# Patient Record
Sex: MALE | Race: WHITE | NOT HISPANIC OR LATINO | Employment: PART TIME | URBAN - METROPOLITAN AREA
[De-identification: names, ages, dates, MRNs, and addresses within clinical notes are randomized per-mention and may not be internally consistent; named-entity substitution may affect disease eponyms.]

---

## 2020-07-24 ENCOUNTER — HOSPITAL ENCOUNTER (EMERGENCY)
Facility: MEDICAL CENTER | Age: 25
End: 2020-07-24
Attending: EMERGENCY MEDICINE
Payer: COMMERCIAL

## 2020-07-24 VITALS
HEIGHT: 76 IN | TEMPERATURE: 97.9 F | OXYGEN SATURATION: 99 % | WEIGHT: 125.66 LBS | HEART RATE: 71 BPM | BODY MASS INDEX: 15.3 KG/M2 | DIASTOLIC BLOOD PRESSURE: 76 MMHG | SYSTOLIC BLOOD PRESSURE: 121 MMHG | RESPIRATION RATE: 18 BRPM

## 2020-07-24 DIAGNOSIS — S09.90XA CLOSED HEAD INJURY, INITIAL ENCOUNTER: ICD-10-CM

## 2020-07-24 PROCEDURE — 99282 EMERGENCY DEPT VISIT SF MDM: CPT | Mod: EDC

## 2020-07-24 PROCEDURE — 700111 HCHG RX REV CODE 636 W/ 250 OVERRIDE (IP): Mod: EDC | Performed by: EMERGENCY MEDICINE

## 2020-07-24 PROCEDURE — 90471 IMMUNIZATION ADMIN: CPT | Mod: EDC

## 2020-07-24 PROCEDURE — 90715 TDAP VACCINE 7 YRS/> IM: CPT | Mod: EDC | Performed by: EMERGENCY MEDICINE

## 2020-07-24 RX ORDER — IBUPROFEN 800 MG/1
800 TABLET ORAL EVERY 8 HOURS PRN
Qty: 30 TAB | Refills: 0 | Status: SHIPPED
Start: 2020-07-24

## 2020-07-24 RX ADMIN — CLOSTRIDIUM TETANI TOXOID ANTIGEN (FORMALDEHYDE INACTIVATED), CORYNEBACTERIUM DIPHTHERIAE TOXOID ANTIGEN (FORMALDEHYDE INACTIVATED), BORDETELLA PERTUSSIS TOXOID ANTIGEN (GLUTARALDEHYDE INACTIVATED), BORDETELLA PERTUSSIS FILAMENTOUS HEMAGGLUTININ ANTIGEN (FORMALDEHYDE INACTIVATED), BORDETELLA PERTUSSIS PERTACTIN ANTIGEN, AND BORDETELLA PERTUSSIS FIMBRIAE 2/3 ANTIGEN 0.5 ML: 5; 2; 2.5; 5; 3; 5 INJECTION, SUSPENSION INTRAMUSCULAR at 17:09

## 2020-07-24 NOTE — LETTER
"  FORM C-4:  EMPLOYEE’S CLAIM FOR COMPENSATION/ REPORT OF INITIAL TREATMENT  EMPLOYEE’S CLAIM - PROVIDE ALL INFORMATION REQUESTED   First Name Eliseo Last Name Hardik Birthdate 1995  Sex male Claim Number   Home Employee Address [address]                                     Zip  [address] Height  1.93 m (6' 4\") Weight  57 kg (125 lb 10.6 oz) [facility]     Mailing Employee Address [address]               Zip  [address] Telephone  [phone] (home)  Primary Language Spoken   Insurer  *** Third Party   N/A Employee's Occupation (Job Title) When Injury or Occupational Disease Occurred     Employer's Name  Telephone     Employer Address  City  State  Zip    Date of Injury         Hour of Injury   Date Employer Notified   Last Day of Work after Injury or Occupational Disease   Supervisor to Whom Injury Reported     Address or Location of Accident (if applicable)    What were you doing at the time of accident? (if applicable)     How did this injury or occupational disease occur? Be specific and answer in detail. Use additional sheet if necessary)     If you believe that you have an occupational disease, when did you first have knowledge of the disability and it relationship to your employment?  Witnesses to the Accident     Nature of Injury or Occupational Disease   Part(s) of Body Injured or Affected  , ,     I CERTIFY THAT THE ABOVE IS TRUE AND CORRECT TO THE BEST OF MY KNOWLEDGE AND THAT I HAVE PROVIDED THIS INFORMATION IN ORDER TO OBTAIN THE BENEFITS OF [place] CHAPTER 617 OF [place]).  I HEREBY AUTHORIZE ANY PHYSICIAN, CHIROPRACTOR, SURGEON, PRACTITIONER, OR OTHER PERSON, ANY HOSPITAL, INCLUDING [facility] OR [facility], ANY MEDICAL SERVICE ORGANIZATION, ANY INSURANCE COMPANY, OR OTHER INSTITUTION OR ORGANIZATION TO RELEASE TO EACH OTHER, " ANY MEDICAL OR OTHER INFORMATION, INCLUDING BENEFITS PAID OR PAYABLE, PERTINENT TO THIS INJURY OR DISEASE, EXCEPT INFORMATION RELATIVE TO DIAGNOSIS, TREATMENT AND/OR COUNSELING FOR AIDS, PSYCHOLOGICAL CONDITIONS, ALCOHOL OR CONTROLLED SUBSTANCES, FOR WHICH I MUST GIVE SPECIFIC AUTHORIZATION.  A PHOTOSTAT OF THIS AUTHORIZATION SHALL BE AS VALID AS THE ORIGINAL.  Date                                      Place                                                                             Employee’s Signature   THIS REPORT MUST BE COMPLETED AND MAILED WITHIN 3 WORKING DAYS OF TREATMENT   Place [facility], EMERGENCY DEPT                                                                             Name of Facility [facility]   Date  7/24/2020 Diagnosis  No diagnosis found. Is there evidence the injured employee was under the influence of alcohol and/or another controlled substance at the time of accident?   Hour  3:40 PM Description of Injury or Disease       Treatment     Have you advised the patient to remain off work five days or more?             X-Ray Findings    If Yes   From Date    To Date      From information given by the employee, together with medical evidence, can you directly connect this injury or occupational disease as job incurred?   If No, is employee capable of: Full Duty    Modified Duty      Is additional medical care by a physician indicated?   If Modified Duty, Specify any Limitations / Restrictions       Do you know of any previous injury or disease contributing to this condition or occupational disease?      Date 7/24/2020 Print Doctor’s Name Boogie Irving I certify the employer’s copy of this form was mailed on:   Address [address] [place] [id number] [phone] INSURER’S USE ONLY   Provider’s Tax ID Number   Telephone Dept: [phone]    Doctor’s Signature   Degree        Form C-4 (rev.10/07)                                                     "                                                              BRIEF DESCRIPTION OF RIGHTS AND BENEFITS  ([place]ursuant to [place] 616[place].050)    Notice of Injury or Occupational Disease (Incident Report Form C-1): If an injury or occupational disease (OD) arises out of and in the course of employment, you must provide written notice to your employer as soon as practicable, but no later than 7 days after the accident or OD. Your employer shall maintain a sufficient supply of the required forms.    Claim for Compensation (Form C-4): If medical treatment is sought, the form C-4 is available at the place of initial treatment. A completed \"Claim for Compensation\" (Form C-4) must be filed within 90 days after an accident or OD. The treating physician or chiropractor must, within 3 working days after treatment, complete and mail to the employer, the employer's insurer and third-party , the Claim for Compensation.    Medical Treatment: If you require medical treatment for your on-the-job injury or OD, you may be required to select a physician or chiropractor from a list provided by your workers’ compensation insurer, if it has contracted with an Organization for Managed Care (MCO) or Preferred Provider Organization (PPO) or providers of health care. If your employer has not entered into a contract with an MCO or PPO, you may select a physician or chiropractor from the Panel of Physicians and Chiropractors. Any medical costs related to your industrial injury or OD will be paid by your insurer.    Temporary Total Disability (TTD): If your doctor has certified that you are unable to work for a period of at least 5 consecutive days, or 5 cumulative days in a 20-day period, or places restrictions on you that your employer does not accommodate, you may be entitled to TTD compensation.    Temporary Partial Disability (TPD): If the wage you receive upon reemployment is less than the compensation for TTD to which you are entitled, " the insurer may be required to pay you TPD compensation to make up the difference. TPD can only be paid for a maximum of 24 months.    Permanent Partial Disability (PPD): When your medical condition is stable and there is an indication of a PPD as a result of your injury or OD, within 30 days, your insurer must arrange for an evaluation by a rating physician or chiropractor to determine the degree of your PPD. The amount of your PPD award depends on the date of injury, the results of the PPD evaluation and your age and wage.    Permanent Total Disability (PTD): If you are medically certified by a treating physician or chiropractor as permanently and totally disabled and have been granted a PTD status by your insurer, you are entitled to receive monthly benefits not to exceed 66 2/3% of your average monthly wage. The amount of your PTD payments is subject to reduction if you previously received a PPD award.    Vocational Rehabilitation Services: You may be eligible for vocational rehabilitation services if you are unable to return to the job due to a permanent physical impairment or permanent restrictions as a result of your injury or occupational disease.    Transportation and Per Masoud Reimbursement: You may be eligible for travel expenses and per masoud associated with medical treatment.    Reopening: You may be able to reopen your claim if your condition worsens after claim closure.     Appeal Process: If you disagree with a written determination issued by the insurer or the insurer does not respond to your request, you may appeal to the Department of Administration, , by following the instructions contained in your determination letter. You must appeal the determination within 70 days from the date of the determination letter at [address], [address], [place], [place] [address], or [address] [facility] [address], [address], [place] [address]. If you disagree with the  decision,  you may appeal to the Department of Administration, . You must file your appeal within 30 days from the date of the  decision letter at [address], [place], [place] [address], or [address] [facility], [place], [place] [address]. If you disagree with a decision of an , you may file a petition for judicial review with the District Court. You must do so within 30 days of the Appeal Officer’s decision. You may be represented by an  at your own expense or you may contact the [facility] for possible representation.    [place]  for Injured Workers (NAIW): If you disagree with a  decision, you may request that NAIW represent you without charge at an  Hearing. For information regarding denial of benefits, you may contact the [facility] at: [address], [address], [place] [address], [phone], or [address], [place] [address], [phone]    To File a Complaint with the Division: If you wish to file a complaint with the  of the Division of Industrial Relations (DIR),  please contact the Workers’ Compensation Section, [address] [place] [address], [place], [place] [address], telephone ([phone]) [phone], or [address], [address], [place], [place] [address], telephone [phone].    For assistance with Workers’ Compensation Issues: You may contact the Office of the Governor Consumer Health Assistance, [address], [address], [place], [place] [address], Toll Free 1-290.197.3586, Web site: http://govcha.[facility].[place].us, E-mail michael@govcha.[email or web address].nv.  D-2 (rev. 06/18)              __________________________________________________________________                                    _________________            Employee Name / Signature                                                                                                                             Date

## 2020-07-24 NOTE — ED NOTES
"First interaction with patient.  Assumed care at this time.  Patient states that he hit his head on a cabinet at work 2 days ago.  Denies LOC or emesis since event.  States \"it ruined my appetite and my mood for the rest of the day.\"  Reports that he is having continued headaches since initial injury.  Bruise and small abrasion to left forehead.  Seen at Occupational Health today and prompted to come to ER.    Patient's NPO status explained by this RN.  Call light provided.  Chart up for ERP.    "

## 2020-07-24 NOTE — LETTER
"  FORM C-4:  EMPLOYEE’S CLAIM FOR COMPENSATION/ REPORT OF INITIAL TREATMENT  EMPLOYEE’S CLAIM - PROVIDE ALL INFORMATION REQUESTED   First Name Eliseo Last Name Hardik Birthdate 1995  Sex male Claim Number   Home Employee Address [address]                                     Zip  [address] Height  1.93 m (6' 4\") Weight  57 kg (125 lb 10.6 oz) [facility]     Mailing Employee Address [address]               Zip  [address] Telephone  [phone] (home)  Primary Language Spoken   Insurer  UNKNOWN Third Party   MISC WORKERS COMP Employee's Occupation (Job Title) When Injury or Occupational Disease Occurred     Employer's Name [employer] Telephone  [phone]   Employer Address [address] [facility] [place] [address]   Date of Injury  7/22/2020       Hour of Injury  3:20 PM Date Employer Notified  7/22/2020 Last Day of Work after Injury or Occupational Disease  7/23/2020 Supervisor to Whom Injury Reported  JULIET ALFARO   Address or Location of Accident (if applicable) [BACK ROOM OF STORE]   What were you doing at the time of accident? (if applicable) USING MY EMPLOYEE LOCKER    How did this injury or occupational disease occur? Be specific and answer in detail. Use additional sheet if necessary)  BENT DOWN TO GET INTO MY PERSONAL LOCKER AND HIT MY HEAD ON AN EXPOSED METAL CABINET DOOR   If you believe that you have an occupational disease, when did you first have knowledge of the disability and it relationship to your employment? NA Witnesses to the Accident  NONE/CCTV   Nature of Injury or Occupational Disease  Workers' Compensation Part(s) of Body Injured or Affected  Skull, Facial Bones, N/A    I CERTIFY THAT THE ABOVE IS TRUE AND CORRECT TO THE BEST OF MY KNOWLEDGE AND THAT I HAVE PROVIDED THIS INFORMATION IN ORDER TO OBTAIN THE [place]EF[place] 616[place], [place]IVE " OR CHAPTER 617 OF [place]).  I HEREBY AUTHORIZE ANY PHYSICIAN, CHIROPRACTOR, SURGEON, PRACTITIONER, OR OTHER PERSON, ANY HOSPITAL, INCLUDING [facility] OR Health system HOSPITAL, ANY MEDICAL SERVICE ORGANIZATION, ANY INSURANCE COMPANY, OR OTHER INSTITUTION OR ORGANIZATION TO RELEASE TO EACH OTHER, ANY MEDICAL OR OTHER INFORMATION, INCLUDING BENEFITS PAID OR PAYABLE, PERTINENT TO THIS INJURY OR DISEASE, EXCEPT INFORMATION RELATIVE TO DIAGNOSIS, TREATMENT AND/OR COUNSELING FOR AIDS, PSYCHOLOGICAL CONDITIONS, ALCOHOL OR CONTROLLED SUBSTANCES, FOR WHICH I MUST GIVE SPECIFIC AUTHORIZATION.  A PHOTOSTAT OF THIS AUTHORIZATION SHALL BE AS VALID AS THE ORIGINAL.  Date                                      Place                                                                             Employee’s Signature   THIS REPORT MUST BE COMPLETED AND MAILED WITHIN 3 WORKING DAYS OF TREATMENT   Place [facility], EMERGENCY DEPT                                                                             Name of Facility [facility]   Date  7/24/2020 Diagnosis  (S09.90XA) Closed head injury, initial encounter Is there evidence the injured employee was under the influence of alcohol and/or another controlled substance at the time of accident?   Hour  4:48 PM Description of Injury or Disease  Closed head injury, initial encounter No   Treatment  motrin  Have you advised the patient to remain off work five days or more?         No   X-Ray Findings    If Yes   From Date    To Date      From information given by the employee, together with medical evidence, can you directly connect this injury or occupational disease as job incurred? Yes If No, is employee capable of: Full Duty  Yes Modified Duty      Is additional medical care by a physician indicated? Yes If Modified Duty, Specify any Limitations / Restrictions   Please see workmans comp prior to returning to work   Do you know of any previous  "injury or disease contributing to this condition or occupational disease? No    Date 7/24/2020 Print Doctor’s Name Boogie Irving I certify the employer’s copy of this form was mailed on:   Address [address] [place] [address]  [place] [address] [phone] INSURER’S USE ONLY   Provider’s Tax ID Number   Telephone Dept: [phone]    Doctor’s Signature aram-BOOGIE Toussaint D.O. Degree        Form C-4 (rev.10/07)                                                                         BRIEF DESCRIPTION OF RIGHTS AND BENEFITS  ([place] to [place] 616C.050)    Notice of Injury or Occupational Disease (Incident Report Form C-1): If an injury or occupational disease (OD) arises out of and in the course of employment, you must provide written notice to your employer as soon as practicable, but no later than 7 days after the accident or OD. Your employer shall maintain a sufficient supply of the required forms.    Claim for Compensation (Form C-4): If medical treatment is sought, the form C-4 is available at the place of initial treatment. A completed \"Claim for Compensation\" (Form C-4) must be filed within 90 days after an accident or OD. The treating physician or chiropractor must, within 3 working days after treatment, complete and mail to the employer, the employer's insurer and third-party , the Claim for Compensation.    Medical Treatment: If you require medical treatment for your on-the-job injury or OD, you may be required to select a physician or chiropractor from a list provided by your workers’ compensation insurer, if it has contracted with an Organization for Managed Care (MCO) or Preferred Provider Organization (PPO) or providers of health care. If your employer has not entered into a contract with an MCO or PPO, you may select a physician or chiropractor from the Panel of Physicians and Chiropractors. Any medical costs related to your industrial injury or OD will be paid by your insurer.    Temporary " Total Disability (TTD): If your doctor has certified that you are unable to work for a period of at least 5 consecutive days, or 5 cumulative days in a 20-day period, or places restrictions on you that your employer does not accommodate, you may be entitled to TTD compensation.    Temporary Partial Disability (TPD): If the wage you receive upon reemployment is less than the compensation for TTD to which you are entitled, the insurer may be required to pay you TPD compensation to make up the difference. TPD can only be paid for a maximum of 24 months.    Permanent Partial Disability (PPD): When your medical condition is stable and there is an indication of a PPD as a result of your injury or OD, within 30 days, your insurer must arrange for an evaluation by a rating physician or chiropractor to determine the degree of your PPD. The amount of your PPD award depends on the date of injury, the results of the PPD evaluation and your age and wage.    Permanent Total Disability (PTD): If you are medically certified by a treating physician or chiropractor as permanently and totally disabled and have been granted a PTD status by your insurer, you are entitled to receive monthly benefits not to exceed 66 2/3% of your average monthly wage. The amount of your PTD payments is subject to reduction if you previously received a PPD award.    Vocational Rehabilitation Services: You may be eligible for vocational rehabilitation services if you are unable to return to the job due to a permanent physical impairment or permanent restrictions as a result of your injury or occupational disease.    Transportation and Per Masoud Reimbursement: You may be eligible for travel expenses and per masoud associated with medical treatment.    Reopening: You may be able to reopen your claim if your condition worsens after claim closure.     Appeal Process: If you disagree with a written determination issued by the insurer or the insurer does not respond  to your request, you may appeal to the Department of Administration, , by following the instructions contained in your determination letter. You must appeal the determination within 70 days from the date of the determination letter at [address], [address], [place], [place] [address], or [address] [facility] [address], [address], [place], [place] [address]. If you disagree with the  decision, you may appeal to the Department of Administration, . You must file your appeal within 30 days from the date of the  decision letter at [address], [address], [place], [place] [address], or [address] [facility] [address], [address], [place], [place] [address]. If you disagree with a decision of an , you may file a petition for judicial review with the District Court. You must do so within 30 days of the Appeal Officer’s decision. You may be represented by an  at your own expense or you may contact the [facility] for possible representation.    [place]  for Injured Workers (NAIW): If you disagree with a  decision, you may request that NAIW represent you without charge at an  Hearing. For information regarding denial of benefits, you may contact the [facility] at: [address], [address], [place] [address], [phone], or [address] [facility] [address], [address], [place] [address], [phone]    To File a Complaint with the Division: If you wish to file a complaint with the  of the Division of Industrial Relations (DIR),  please contact the Workers’ Compensation Section, [address], [address], [place], [place] [address], telephone (364) 820-8827, or [address], [place], [place] [address], telephone [phone].    For assistance with Workers’ Compensation Issues: You may contact the Office of the Governor Consumer Health Assistance, [address], [place], [place] [address], Toll Free 1-210.125.5177, Web site: http://govDun[facility] Hospital.[facility].nv., E-mail [email or web address].nv.  D-2 (rev. 06/18)              __________________________________________________________________                                    _________________            Employee Name / Signature                                                                                                                            Date

## 2020-07-24 NOTE — ED TRIAGE NOTES
Eliseo Dye presents to triage, wearing a mask, reporting:  Chief Complaint   Patient presents with   • Head Injury     frontal, hit on cabinet. 2 days ago   • Sent from Urgent Care   • Headache     since injury     Pt was seen by [facility], sent here. Pt denies blurred vision/dizziness or any other neuro changes. Mild headache ongoing.     Pt denies any respiratory or flu like symptoms at this time.    Pt speaking in full sentences, NAD noted. Pt educated of triage process, placed back in waiting area pending room assignment.

## 2020-07-24 NOTE — ED PROVIDER NOTES
ED Provider Note    CHIEF COMPLAINT  Chief Complaint   Patient presents with   • Head Injury     frontal, hit on cabinet. 2 days ago   • Sent from Urgent Care   • Headache     since injury       HPI  Eliseo Dye is a 24 y.o. male here for evaluation of a head injury. The pt states he struck his head on a metal cabinet two days ago.  He states that he was bending down to get into his locker, when he struck the corner of the cabinet.  He did not have any loc, he has not had any vomiting, and is not on any blood thinners. The pt c/o a mild headache, but denies any falling, syncope, or weakness.  He denies paresthesias.  The pt states he did have an abrasion to the left forehead, and is requesting a tetanus shot.       ROS  See HPI for further details, o/w negative.     PAST MEDICAL HISTORY   no bleeding disorder     SOCIAL HISTORY  Social History     Tobacco Use   • Smoking status: Never Smoker   • Smokeless tobacco: Never Used   Substance and Sexual Activity   • Alcohol use: Not Currently   • Drug use: Not Currently   • Sexual activity: Not on file       Family History  No bleeding disorders     SURGICAL HISTORY  patient denies any surgical history    CURRENT MEDICATIONS  Home Medications     Reviewed by Schuyler B Collett, R.N. (Registered Nurse) on 07/24/20 at 1444  Med List Status: <None>   Medication Last Dose Status   benzonatate (TESSALON) 100 MG CAPS  Active   Hydrocod Polst-Chlorphen Polst (TUSSIONEX PENNKINETIC ER) 10-8 MG/5ML LQCR  Active   Pseudoephedrine HCl (SUDAFED 24 HOUR NON-DROWSY) 240 MG TB24  Active                ALLERGIES  Allergies   Allergen Reactions   • Corn Oil Anaphylaxis   • Peanut Butter Flavor Hives       REVIEW OF SYSTEMS  See HPI for further details. Review of systems as above, otherwise all other systems are negative.     PHYSICAL EXAM  Constitutional: Well developed, well nourished. No acute distress.  HEENT: Normocephalic, left forehead with 1cm superficial abrasion. Posterior  pharynx clear and moist.  Eyes:  EOMI. Normal sclera.  Neck: Supple, Full range of motion, nontender.  Chest/Pulmonary: clear to ausculation. Symmetrical expansion.   Abdomen: Soft, nontender. No peritoneal signs. No guarding. No palpable masses.  Musculoskeletal: No deformity, no edema, neurovascular intact.   Neuro: Clear speech, appropriate, cooperative, cranial nerves II-XII grossly intact.  Psych: Normal mood and affect    PROCEDURES     MEDICAL RECORD  I have reviewed patient's medical record and pertinent results are listed.    COURSE & MEDICAL DECISION MAKING  I have reviewed any medical record information, laboratory studies and radiographic results as noted above.    4:03 PM  The patient had an injury at work 2 days ago, but did not have any loss of consciousness, has no vomiting, and no blood thinner use.  He has not had any episodes of syncope, paresthesias or weakness.  At this time I will have him follow-up with Workmen's Comp., or return here for any further issues or concerns.  He is requesting a tetanus shot secondary to his abrasion on the forehead, and states that he has not had one in over 10 years.  The patient does not need a CT scan at this time, but will be given concussion instructions for when to return.    If you have had any blood pressure issues while here in the emergency department, please see your doctor for a further evaluation or work up.    Differential diagnoses include but not limited to: Subdural, subarachnoid, epidural.  Concussive syndrome, tetanus update    This patient presents with Closed head injury .  At this time, I have counseled the patient/family regarding their medications, pain control, and follow up.  They will continue their medications, if any, as prescribed.  They will return immediately for any worsening symptoms and/or any other medical concerns.  They will see their doctor, or contact the doctor provided, in 1-2 days for follow up.       FINAL  IMPRESSION  Close head injury  Tetanus update      Electronically signed by: Boogie Irving D.O., 7/24/2020 3:58 PM

## 2020-07-25 NOTE — ED NOTES
Eliseo Dye D/C'surinder.  Discharge instructions including s/s to return to ED, follow up appointments, hydration importance and closed head injury provided to pt/family.    Parents verbalized understanding with no further questions and concerns.    Copy of discharge provided to pt/family.  Signed copy in chart.    Prescription for motrin provided to pt.   Pt walked out of department with girlfriend; pt in NAD, awake, alert, interactive and age appropriate.